# Patient Record
Sex: MALE | Race: WHITE
[De-identification: names, ages, dates, MRNs, and addresses within clinical notes are randomized per-mention and may not be internally consistent; named-entity substitution may affect disease eponyms.]

---

## 2018-09-21 ENCOUNTER — HOSPITAL ENCOUNTER (EMERGENCY)
Dept: HOSPITAL 36 - ER | Age: 38
Discharge: HOME | End: 2018-09-21
Payer: COMMERCIAL

## 2018-09-21 DIAGNOSIS — R11.2: ICD-10-CM

## 2018-09-21 DIAGNOSIS — Z87.442: ICD-10-CM

## 2018-09-21 DIAGNOSIS — R10.9: Primary | ICD-10-CM

## 2018-09-21 LAB
ALBUMIN SERPL-MCNC: 4.5 GM/DL (ref 4.2–5.5)
ALBUMIN/GLOB SERPL: 1.7 {RATIO} (ref 1–1.8)
ALP SERPL-CCNC: 42 U/L (ref 34–104)
ALT SERPL-CCNC: 19 U/L (ref 7–52)
AMPHET UR-MCNC: NEGATIVE NG/ML
ANION GAP SERPL CALC-SCNC: 15.3 MMOL/L (ref 7–16)
APPEARANCE UR: CLEAR
AST SERPL-CCNC: 22 U/L (ref 13–39)
BARBITURATES UR-MCNC: NEGATIVE UG/ML
BASOPHILS # BLD AUTO: 0.1 TH/CUMM (ref 0–0.2)
BASOPHILS NFR BLD AUTO: 0.9 % (ref 0–2)
BENZODIAZEPINES PNL UR: NEGATIVE
BILIRUB SERPL-MCNC: 0.6 MG/DL (ref 0.3–1)
BILIRUB UR-MCNC: NEGATIVE MG/DL
BUN SERPL-MCNC: 24 MG/DL (ref 7–25)
CALCIUM SERPL-MCNC: 9.4 MG/DL (ref 8.6–10.3)
CANNABINOIDS SERPL QL CFM: POSITIVE
CHLORIDE SERPL-SCNC: 103 MEQ/L (ref 98–107)
CO2 SERPL-SCNC: 24.6 MEQ/L (ref 21–31)
COCAINE METAB.OTHER UR-MCNC: NEGATIVE NG/ML
COLOR UR: YELLOW
CREAT SERPL-MCNC: 0.9 MG/DL (ref 0.7–1.3)
EOSINOPHIL # BLD AUTO: 0.1 TH/CMM (ref 0.1–0.4)
EOSINOPHIL NFR BLD AUTO: 1.5 % (ref 0–5)
ERYTHROCYTE [DISTWIDTH] IN BLOOD BY AUTOMATED COUNT: 12.5 % (ref 11.5–20)
GLOBULIN SER-MCNC: 2.6 GM/DL
GLUCOSE SERPL-MCNC: 93 MG/DL (ref 70–105)
GLUCOSE UR STRIP-MCNC: NEGATIVE MG/DL
HCT VFR BLD CALC: 43.5 % (ref 41–60)
HGB BLD-MCNC: 14.7 GM/DL (ref 12–16)
KETONES UR STRIP-MCNC: NEGATIVE MG/DL
LEUKOCYTE ESTERASE UR-ACNC: NEGATIVE
LYMPHOCYTE AB SER FC-ACNC: 2.1 TH/CMM (ref 1.5–3)
LYMPHOCYTES NFR BLD AUTO: 23.7 % (ref 20–50)
MCH RBC QN AUTO: 29.8 PG (ref 26–30)
MCHC RBC AUTO-ENTMCNC: 33.8 PG (ref 28–36)
MCV RBC AUTO: 88.1 FL (ref 80–99)
METHADONE UR CFM-MCNC: NEGATIVE NG/ML
METHAMPHET UR QL: NEGATIVE
MICRO URNS: NO
MONOCYTES # BLD AUTO: 0.6 TH/CMM (ref 0.3–1)
MONOCYTES NFR BLD AUTO: 7.3 % (ref 2–10)
NEUTROPHILS # BLD: 5.8 TH/CMM (ref 1.8–8)
NEUTROPHILS NFR BLD AUTO: 66.6 % (ref 40–80)
NITRITE UR QL STRIP: NEGATIVE
OPIATES UR QL: NEGATIVE
PCP UR-MCNC: NEGATIVE UG/L
PH UR STRIP: 8 [PH] (ref 4.6–8)
PLATELET # BLD: 262 TH/CMM (ref 150–400)
PMV BLD AUTO: 9 FL
POTASSIUM SERPL-SCNC: 3.9 MEQ/L (ref 3.5–5.1)
PROT UR STRIP-MCNC: NEGATIVE MG/DL
RBC # BLD AUTO: 4.94 MIL/CMM (ref 4.3–5.7)
RBC # UR STRIP: NEGATIVE /UL
SODIUM SERPL-SCNC: 139 MEQ/L (ref 136–145)
SP GR UR STRIP: 1.01 (ref 1–1.03)
TRICYCLICS UR QL: NEGATIVE
URINALYSIS COMPLETE PNL UR: (no result)
UROBILINOGEN UR STRIP-ACNC: 0.2 E.U./DL (ref 0.2–1)
WBC # BLD AUTO: 8.7 TH/CMM (ref 4.8–10.8)

## 2018-09-21 PROCEDURE — 87040 BLOOD CULTURE FOR BACTERIA: CPT

## 2018-09-21 PROCEDURE — 80307 DRUG TEST PRSMV CHEM ANLYZR: CPT

## 2018-09-21 PROCEDURE — C9113 INJ PANTOPRAZOLE SODIUM, VIA: HCPCS

## 2018-09-21 PROCEDURE — 85025 COMPLETE CBC W/AUTO DIFF WBC: CPT

## 2018-09-21 PROCEDURE — 96375 TX/PRO/DX INJ NEW DRUG ADDON: CPT

## 2018-09-21 PROCEDURE — 99285 EMERGENCY DEPT VISIT HI MDM: CPT

## 2018-09-21 PROCEDURE — 76700 US EXAM ABDOM COMPLETE: CPT

## 2018-09-21 PROCEDURE — 80053 COMPREHEN METABOLIC PANEL: CPT

## 2018-09-21 PROCEDURE — 81003 URINALYSIS AUTO W/O SCOPE: CPT

## 2018-09-21 PROCEDURE — 96365 THER/PROPH/DIAG IV INF INIT: CPT

## 2018-09-21 PROCEDURE — 36415 COLL VENOUS BLD VENIPUNCTURE: CPT

## 2018-09-21 NOTE — ED PHYSICIAN CHART
ED Chief Complaint/HPI





- Patient Information


Date Seen:: 09/21/18


Time Seen:: 06:45


Chief Complaint:: abd pain vomiting


History of Present Illness:: 





37 yr old male with hx of kidney stones with pain rt side and vomiting today 

for 3 hrs 8/10 vomited up to 10 times ate bean burrito last night at 11pm


Allergies:: 


 Allergies











Allergy/AdvReac Type Severity Reaction Status Date / Time


 


No Known Allergies Allergy   Verified 09/21/18 06:46











Vitals:: 


 Vital Signs - 8 hr











  09/21/18





  06:24


 


Temp 97.6 F


 


HR 87


 


RR 17


 


/88


 


O2 Sat % 96














ED Review of Systems





- Review of Systems


General/Constitutional: No fever


Skin: No skin lesions


Head: No headache


Eyes: No loss of vision


ENT: No earache


Neck: No neck pain


Cardio Vascular: No chest pain


Pulmonary: No SOB


GI: Nausea, Vomiting


G/U: No dysuria


Musculoskeletal: No bone or joint pain


Psychiatric: No anxiety


Hematopoietic: No bruising


Allergic/Immuno: No urticaria


Neurological: No syncope





Family Medical History





- Family Member


  ** Mother


Ethnicity: 





ED Septic Shock





- .


Is Septic Shock (SBP<90, OR Lactate>4 mmol\L) present?: No





- <6hrs of presentation:


Vital Signs: 


 Vital Signs - 8 hr











  09/21/18





  06:24


 


Temp 97.6 F


 


HR 87


 


RR 17


 


/88


 


O2 Sat % 96














ED Reassessment (Disposition)





- Reassessment


Reassessment Condition:: Improved





- Diagnosis


Diagnosis:: 





nvabd pain





- Patient Disposition


Condition at Disposition:: Stable

## 2018-09-21 NOTE — DIAGNOSTIC IMAGING REPORT
Abdominal percent



HISTORY: Pain



The exam is very limited due to continual patient motion and lack of

cooperation.



The liver exhibits a normal size with a homogeneous parenchyma and no

focal lesions.



There is a 4 mm intraluminal echogenic density adherent to the

gallbladder wall.  The finding may be associated with a small

cholesterol polyp.  Adherent calculus cannot be definitely excluded.  No

biliary dilatation present is common bile duct is 3 mm).



The right kidney measures approximately 8.7 x 5.2 x 4.8 cm.  There is a

2.0 cm sonolucent density consistent with a cyst noted extending off the

cortex.  Detail is limited as noted above.  There appears to mild

dilatation of the renal pelvis without mayela hydronephrosis.  The left

kidney appears normal.  The spleen is slightly generous in size (13.5 cm

length).  No other retroperitoneal or intra-abdominal abnormalities.



IMPRESSION:

1.  The exam is limited due to continued patient motion and lack of

cooperation

2.  4 mm intraluminal echogenic density adherent to the inner

gallbladder wall.  The findings suggest changes of a small cholesterol

polyp.  An adherent calculus cannot be definitely excluded.

3.  Findings consistent with a right renal cyst

4.  Generous splenic size